# Patient Record
Sex: MALE | Race: WHITE
[De-identification: names, ages, dates, MRNs, and addresses within clinical notes are randomized per-mention and may not be internally consistent; named-entity substitution may affect disease eponyms.]

---

## 2020-01-09 ENCOUNTER — HOSPITAL ENCOUNTER (EMERGENCY)
Dept: HOSPITAL 54 - ER | Age: 25
Discharge: HOME | End: 2020-01-09
Payer: MEDICAID

## 2020-01-09 VITALS — WEIGHT: 120 LBS | HEIGHT: 71 IN | BODY MASS INDEX: 16.8 KG/M2

## 2020-01-09 VITALS — SYSTOLIC BLOOD PRESSURE: 113 MMHG | DIASTOLIC BLOOD PRESSURE: 66 MMHG

## 2020-01-09 DIAGNOSIS — M25.512: ICD-10-CM

## 2020-01-09 DIAGNOSIS — M54.10: Primary | ICD-10-CM
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
## 2020-01-09 DIAGNOSIS — F10.10: ICD-10-CM

## 2020-01-09 LAB
ALBUMIN SERPL BCP-MCNC: 4.1 G/DL (ref 3.4–5)
ALP SERPL-CCNC: 108 U/L (ref 46–116)
ALT SERPL W P-5'-P-CCNC: 20 U/L (ref 12–78)
AST SERPL W P-5'-P-CCNC: 17 U/L (ref 15–37)
BASOPHILS # BLD AUTO: 0 /CMM (ref 0–0.2)
BASOPHILS NFR BLD AUTO: 1 % (ref 0–2)
BILIRUB DIRECT SERPL-MCNC: 0.2 MG/DL (ref 0–0.2)
BILIRUB SERPL-MCNC: 0.6 MG/DL (ref 0.2–1)
BUN SERPL-MCNC: 8 MG/DL (ref 7–18)
CALCIUM SERPL-MCNC: 9 MG/DL (ref 8.5–10.1)
CHLORIDE SERPL-SCNC: 97 MMOL/L (ref 98–107)
CO2 SERPL-SCNC: 28 MMOL/L (ref 21–32)
CREAT SERPL-MCNC: 0.5 MG/DL (ref 0.6–1.3)
EOSINOPHIL NFR BLD AUTO: 0.4 % (ref 0–6)
GLUCOSE SERPL-MCNC: 95 MG/DL (ref 74–106)
HCT VFR BLD AUTO: 41 % (ref 39–51)
HGB BLD-MCNC: 14.4 G/DL (ref 13.5–17.5)
LYMPHOCYTES NFR BLD AUTO: 0.6 /CMM (ref 0.8–4.8)
LYMPHOCYTES NFR BLD AUTO: 20.7 % (ref 20–44)
MCHC RBC AUTO-ENTMCNC: 35 G/DL (ref 31–36)
MCV RBC AUTO: 89 FL (ref 80–96)
MONOCYTES NFR BLD AUTO: 0.2 /CMM (ref 0.1–1.3)
MONOCYTES NFR BLD AUTO: 8.9 % (ref 2–12)
NEUTROPHILS # BLD AUTO: 1.9 /CMM (ref 1.8–8.9)
NEUTROPHILS NFR BLD AUTO: 69 % (ref 43–81)
PLATELET # BLD AUTO: 166 /CMM (ref 150–450)
POTASSIUM SERPL-SCNC: 3.6 MMOL/L (ref 3.5–5.1)
PROT SERPL-MCNC: 7.1 G/DL (ref 6.4–8.2)
RBC # BLD AUTO: 4.67 MIL/UL (ref 4.5–6)
SODIUM SERPL-SCNC: 131 MMOL/L (ref 136–145)
WBC NRBC COR # BLD AUTO: 2.7 K/UL (ref 4.3–11)

## 2020-01-09 NOTE — NUR
PT CAME INTO THE ED C/O BACK OF HEAD AND UPPER BACK PAIN X 5 DAYS, MOVED BOXES 
OVER THE WEEKEND. AAOX4, VSS, BREATHING EVEN AND UNLABORED ON TOOM AIR W/ NAD 
NOTED. PT CONNECTED TO THE MONITOR AND POX.

## 2020-01-13 ENCOUNTER — HOSPITAL ENCOUNTER (EMERGENCY)
Dept: HOSPITAL 54 - ER | Age: 25
Discharge: HOME | End: 2020-01-13
Payer: SELF-PAY

## 2020-01-13 VITALS — DIASTOLIC BLOOD PRESSURE: 67 MMHG | SYSTOLIC BLOOD PRESSURE: 112 MMHG

## 2020-01-13 VITALS — HEIGHT: 71 IN | BODY MASS INDEX: 16.24 KG/M2 | WEIGHT: 116 LBS

## 2020-01-13 DIAGNOSIS — R63.0: Primary | ICD-10-CM

## 2020-01-13 DIAGNOSIS — F41.9: ICD-10-CM

## 2020-01-13 LAB
ALBUMIN SERPL BCP-MCNC: 4 G/DL (ref 3.4–5)
ALP SERPL-CCNC: 102 U/L (ref 46–116)
ALT SERPL W P-5'-P-CCNC: 20 U/L (ref 12–78)
APAP SERPL-MCNC: 0 UG/ML (ref 10–30)
AST SERPL W P-5'-P-CCNC: 16 U/L (ref 15–37)
BASOPHILS # BLD AUTO: 0 /CMM (ref 0–0.2)
BASOPHILS NFR BLD AUTO: 1 % (ref 0–2)
BILIRUB DIRECT SERPL-MCNC: 0.2 MG/DL (ref 0–0.2)
BILIRUB SERPL-MCNC: 0.9 MG/DL (ref 0.2–1)
BUN SERPL-MCNC: 9 MG/DL (ref 7–18)
CALCIUM SERPL-MCNC: 8.7 MG/DL (ref 8.5–10.1)
CHLORIDE SERPL-SCNC: 96 MMOL/L (ref 98–107)
CO2 SERPL-SCNC: 23 MMOL/L (ref 21–32)
CREAT SERPL-MCNC: 0.5 MG/DL (ref 0.6–1.3)
EOSINOPHIL NFR BLD AUTO: 0.4 % (ref 0–6)
ETHANOL SERPL-MCNC: < 3 MG/DL (ref 0–0)
GLUCOSE SERPL-MCNC: 135 MG/DL (ref 74–106)
HCT VFR BLD AUTO: 40 % (ref 39–51)
HGB BLD-MCNC: 14.1 G/DL (ref 13.5–17.5)
LYMPHOCYTES NFR BLD AUTO: 0.8 /CMM (ref 0.8–4.8)
LYMPHOCYTES NFR BLD AUTO: 27.8 % (ref 20–44)
MCHC RBC AUTO-ENTMCNC: 36 G/DL (ref 31–36)
MCV RBC AUTO: 88 FL (ref 80–96)
MONOCYTES NFR BLD AUTO: 0.4 /CMM (ref 0.1–1.3)
MONOCYTES NFR BLD AUTO: 11.7 % (ref 2–12)
NEUTROPHILS # BLD AUTO: 1.8 /CMM (ref 1.8–8.9)
NEUTROPHILS NFR BLD AUTO: 59.1 % (ref 43–81)
PLATELET # BLD AUTO: 183 /CMM (ref 150–450)
POTASSIUM SERPL-SCNC: 3.5 MMOL/L (ref 3.5–5.1)
PROT SERPL-MCNC: 7.1 G/DL (ref 6.4–8.2)
RBC # BLD AUTO: 4.52 MIL/UL (ref 4.5–6)
SALICYLATES SERPL-MCNC: < 0.2 MG/DL (ref 2.8–20)
SODIUM SERPL-SCNC: 131 MMOL/L (ref 136–145)
WBC NRBC COR # BLD AUTO: 3 K/UL (ref 4.3–11)

## 2020-01-13 PROCEDURE — 99284 EMERGENCY DEPT VISIT MOD MDM: CPT

## 2020-01-13 PROCEDURE — 85025 COMPLETE CBC W/AUTO DIFF WBC: CPT

## 2020-01-13 PROCEDURE — 80329 ANALGESICS NON-OPIOID 1 OR 2: CPT

## 2020-01-13 PROCEDURE — 80048 BASIC METABOLIC PNL TOTAL CA: CPT

## 2020-01-13 PROCEDURE — 80307 DRUG TEST PRSMV CHEM ANLYZR: CPT

## 2020-01-13 PROCEDURE — 80076 HEPATIC FUNCTION PANEL: CPT

## 2020-01-13 PROCEDURE — 96374 THER/PROPH/DIAG INJ IV PUSH: CPT

## 2020-01-13 PROCEDURE — 80305 DRUG TEST PRSMV DIR OPT OBS: CPT

## 2020-01-13 PROCEDURE — 36415 COLL VENOUS BLD VENIPUNCTURE: CPT

## 2020-01-13 PROCEDURE — G0480 DRUG TEST DEF 1-7 CLASSES: HCPCS

## 2020-01-13 NOTE — NUR
Pt came into the ed c/o anxiety, "i cant get myself healthy." for couple of 
months. Pt aaox4, vss, breathing even and unlabored on room air w/ nad noted. 
Pt connected to the monitor and pox.

## 2020-01-13 NOTE — NUR
Social service consult requested by Dr. Cuba for pt. requesting resources for inpatient 
bed for eating disorders. Per chart review and MD notes,  pt, history of eating disorder and 
anxiety who presented to the emergency room hoping to get an inpatient bed. Patient has had 
increase in his panic attacks and he feels like his eating disorder is out of control. 
Patient does not vomit but he does restrict his eating. Patient complains of feeling weak 
all over. [facility] Pt. is alert and oriented x 4. Pt appears to be a 
little anxious. Pt's mood is congruent. Pt. informed LCSW he suffers from an eating disorder 
and anxiety associated with nerve pain. Pt. expressed having muscle fatigue for the past 
week. Pt. reported as being anorexic in the past but now is restrictive eating. Pt. was at 
an eating disorder program 10 years ago in [place]. Pt. resides with his roommate Garrison. 
Pt's roommate Garrison and friend Julian joined pt. bedside. [facility] to speak to 
Garrison and Julian regarding his plan of care. [facility] provided pt with active listening, 
supportive counseling and positive coping skills. [facility] also gave pt. the contact information 
to [facility] in [facility] and encouraged pt. to apply for [employer] as soon as possible, since he will 
need it to get into any inpatient or outpatient program. Pt was given list of Eating 
Disorder Resources which included, hotlines, inpatient and outpatient treatment. 



No other social service needs are requested at this time. [facility] updated Dr. Cuba with 
aforementioned information and pt's discharge plan.